# Patient Record
Sex: MALE | Race: WHITE | Employment: FULL TIME | URBAN - METROPOLITAN AREA
[De-identification: names, ages, dates, MRNs, and addresses within clinical notes are randomized per-mention and may not be internally consistent; named-entity substitution may affect disease eponyms.]

---

## 2019-02-27 ENCOUNTER — OFFICE VISIT (OUTPATIENT)
Dept: URGENT CARE | Facility: URGENT CARE | Age: 29
End: 2019-02-27
Payer: COMMERCIAL

## 2019-02-27 VITALS
OXYGEN SATURATION: 96 % | HEART RATE: 89 BPM | WEIGHT: 148 LBS | TEMPERATURE: 98.2 F | SYSTOLIC BLOOD PRESSURE: 130 MMHG | DIASTOLIC BLOOD PRESSURE: 68 MMHG

## 2019-02-27 DIAGNOSIS — R59.9 SWOLLEN GLAND: ICD-10-CM

## 2019-02-27 DIAGNOSIS — J02.9 SORE THROAT: Primary | ICD-10-CM

## 2019-02-27 LAB
BASOPHILS # BLD AUTO: 0 10E9/L (ref 0–0.2)
BASOPHILS NFR BLD AUTO: 0.4 %
DEPRECATED S PYO AG THROAT QL EIA: NORMAL
DIFFERENTIAL METHOD BLD: NORMAL
EOSINOPHIL # BLD AUTO: 0.2 10E9/L (ref 0–0.7)
EOSINOPHIL NFR BLD AUTO: 2.9 %
ERYTHROCYTE [DISTWIDTH] IN BLOOD BY AUTOMATED COUNT: 12.6 % (ref 10–15)
HCT VFR BLD AUTO: 42 % (ref 40–53)
HETEROPH AB SER QL: NEGATIVE
HGB BLD-MCNC: 14.7 G/DL (ref 13.3–17.7)
LYMPHOCYTES # BLD AUTO: 2.9 10E9/L (ref 0.8–5.3)
LYMPHOCYTES NFR BLD AUTO: 34.5 %
MCH RBC QN AUTO: 31.5 PG (ref 26.5–33)
MCHC RBC AUTO-ENTMCNC: 35 G/DL (ref 31.5–36.5)
MCV RBC AUTO: 90 FL (ref 78–100)
MONOCYTES # BLD AUTO: 1 10E9/L (ref 0–1.3)
MONOCYTES NFR BLD AUTO: 11.8 %
NEUTROPHILS # BLD AUTO: 4.2 10E9/L (ref 1.6–8.3)
NEUTROPHILS NFR BLD AUTO: 50.4 %
PLATELET # BLD AUTO: 307 10E9/L (ref 150–450)
RBC # BLD AUTO: 4.67 10E12/L (ref 4.4–5.9)
SPECIMEN SOURCE: NORMAL
WBC # BLD AUTO: 8.3 10E9/L (ref 4–11)

## 2019-02-27 PROCEDURE — 87880 STREP A ASSAY W/OPTIC: CPT | Performed by: FAMILY MEDICINE

## 2019-02-27 PROCEDURE — 86308 HETEROPHILE ANTIBODY SCREEN: CPT | Performed by: FAMILY MEDICINE

## 2019-02-27 PROCEDURE — 99203 OFFICE O/P NEW LOW 30 MIN: CPT | Performed by: FAMILY MEDICINE

## 2019-02-27 PROCEDURE — 85025 COMPLETE CBC W/AUTO DIFF WBC: CPT | Performed by: FAMILY MEDICINE

## 2019-02-27 PROCEDURE — 87081 CULTURE SCREEN ONLY: CPT | Performed by: FAMILY MEDICINE

## 2019-02-27 PROCEDURE — 36415 COLL VENOUS BLD VENIPUNCTURE: CPT | Performed by: FAMILY MEDICINE

## 2019-02-27 NOTE — PROGRESS NOTES
SUBJECTIVE:   New patient to [facility]    Atul Carnes is a 28 year old male presenting with a chief complaint of sore throat, swollen gland.  No fever.  Mild sinus congestion initially but has improved.  No ear pain, no cough.  Onset of symptoms was 3 week(s) ago.  Course of illness is worsening.    Severity moderate  Current and Associated symptoms: left sided swollen gland  Treatment measures tried include Fluids, Rest and ibuprofen.  Predisposing factors include None.    Overall healthy.  No prior mono infection.  Last dental exam couple of years ago, no dental pain.    No current Primary Provider    No past medical history on file.  No current outpatient medications on file.     Social History     Tobacco Use     Smoking status: Never Smoker     Smokeless tobacco: Never Used   Substance Use Topics     Alcohol use: Not on file       ROS:  Review of systems negative except as stated above.    OBJECTIVE:  /68 (Cuff Size: Adult Regular)   Pulse 89   Temp 98.2  F (36.8  C) (Oral)   Wt 67.1 kg (148 lb)   SpO2 96%   GENERAL APPEARANCE: healthy, alert and no distress  EYES: EOMI,  PERRL, conjunctiva clear  HENT: ear canals and TM's normal.  Nose and mouth without ulcers, erythema or lesions  NECK: supple, nontender, left submandibular enlarge gland  RESP: lungs clear to auscultation - no rales, rhonchi or wheezes  CV: regular rates and rhythm, normal S1 S2, no murmur noted  Extremities: no peripheral edema or tenderness, peripheral pulses normal  NEURO: Normal strength and tone, sensory exam grossly normal,  normal speech and mentation  SKIN: no suspicious lesions or rashes  PSYCH: mentation appears normal and affect normal/bright    Results for orders placed or performed in visit on 02/27/19   CBC with platelets and differential   Result Value Ref Range    WBC 8.3 4.0 - 11.0 10e9/L    RBC Count 4.67 4.4 - 5.9 10e12/L    Hemoglobin 14.7 13.3 - 17.7 g/dL    Hematocrit 42.0 40.0 - 53.0 %    MCV 90 78 - 100 fl     MCH 31.5 26.5 - 33.0 pg    MCHC 35.0 31.5 - 36.5 g/dL    RDW 12.6 10.0 - 15.0 %    Platelet Count 307 150 - 450 10e9/L    Diff Method Automated Method     % Neutrophils 50.4 %    % Lymphocytes 34.5 %    % Monocytes 11.8 %    % Eosinophils 2.9 %    % Basophils 0.4 %    Absolute Neutrophil 4.2 1.6 - 8.3 10e9/L    Absolute Lymphocytes 2.9 0.8 - 5.3 10e9/L    Absolute Monocytes 1.0 0.0 - 1.3 10e9/L    Absolute Eosinophils 0.2 0.0 - 0.7 10e9/L    Absolute Basophils 0.0 0.0 - 0.2 10e9/L   Mononucleosis screen   Result Value Ref Range    Mononucleosis Screen Negative NEG^Negative   Rapid strep screen   Result Value Ref Range    Specimen Description Throat     Rapid Strep A Screen       NEGATIVE: No Group A streptococcal antigen detected by immunoassay, await culture report.       ASSESSMENT/PLAN:  (J02.9) Sore throat  (primary encounter diagnosis)  Comment: viral  Plan: Rapid strep screen, Beta strep group A culture,        CBC with platelets and differential,         Mononucleosis screen            (R59.9) Swollen gland  Comment: viral  Plan: CBC with platelets and differential,         Mononucleosis screen            Reassurance given, patient appears well and non-toxic appearing.  Will follow up on throat culture and treat if positive for strep.  Encourage to obtain dental exam to ensure swollen gland is not due to teeth etiology.  Recommend tylenol, ibuprofen and warm compress to area.     Encourage to establish with primary provider for recheck if still present in 2 weeks.    Jordin Calix MD  February 27, 2019 6:11 PM

## 2019-02-28 LAB
BACTERIA SPEC CULT: NORMAL
SPECIMEN SOURCE: NORMAL

## 2019-04-25 ENCOUNTER — OFFICE VISIT (OUTPATIENT)
Dept: PEDIATRICS | Facility: CLINIC | Age: 29
End: 2019-04-25
Payer: COMMERCIAL

## 2019-04-25 VITALS
WEIGHT: 134.8 LBS | TEMPERATURE: 97.9 F | BODY MASS INDEX: 21.16 KG/M2 | SYSTOLIC BLOOD PRESSURE: 106 MMHG | HEART RATE: 85 BPM | HEIGHT: 67 IN | DIASTOLIC BLOOD PRESSURE: 70 MMHG | OXYGEN SATURATION: 98 %

## 2019-04-25 DIAGNOSIS — Z23 NEED FOR PNEUMOCOCCAL VACCINATION: ICD-10-CM

## 2019-04-25 DIAGNOSIS — J45.20 MILD INTERMITTENT ASTHMA WITHOUT COMPLICATION: Primary | ICD-10-CM

## 2019-04-25 DIAGNOSIS — Z23 NEED FOR TETANUS BOOSTER: ICD-10-CM

## 2019-04-25 PROCEDURE — 90715 TDAP VACCINE 7 YRS/> IM: CPT | Performed by: STUDENT IN AN ORGANIZED HEALTH CARE EDUCATION/TRAINING PROGRAM

## 2019-04-25 PROCEDURE — 90472 IMMUNIZATION ADMIN EACH ADD: CPT | Performed by: STUDENT IN AN ORGANIZED HEALTH CARE EDUCATION/TRAINING PROGRAM

## 2019-04-25 PROCEDURE — 90732 PPSV23 VACC 2 YRS+ SUBQ/IM: CPT | Performed by: STUDENT IN AN ORGANIZED HEALTH CARE EDUCATION/TRAINING PROGRAM

## 2019-04-25 PROCEDURE — 90471 IMMUNIZATION ADMIN: CPT | Performed by: STUDENT IN AN ORGANIZED HEALTH CARE EDUCATION/TRAINING PROGRAM

## 2019-04-25 PROCEDURE — 99213 OFFICE O/P EST LOW 20 MIN: CPT | Mod: GC | Performed by: STUDENT IN AN ORGANIZED HEALTH CARE EDUCATION/TRAINING PROGRAM

## 2019-04-25 RX ORDER — LORATADINE 10 MG/1
10 TABLET ORAL DAILY
COMMUNITY

## 2019-04-25 RX ORDER — ALBUTEROL SULFATE 90 UG/1
2 AEROSOL, METERED RESPIRATORY (INHALATION) EVERY 4 HOURS PRN
Qty: 8.5 G | Refills: 3 | Status: SHIPPED | OUTPATIENT
Start: 2019-04-25 | End: 2019-10-21

## 2019-04-25 RX ORDER — CETIRIZINE HYDROCHLORIDE 5 MG/1
5 TABLET ORAL DAILY
COMMUNITY

## 2019-04-25 ASSESSMENT — ASTHMA QUESTIONNAIRES
QUESTION_2 LAST FOUR WEEKS HOW OFTEN HAVE YOU HAD SHORTNESS OF BREATH: ONCE OR TWICE A WEEK
QUESTION_4 LAST FOUR WEEKS HOW OFTEN HAVE YOU USED YOUR RESCUE INHALER OR NEBULIZER MEDICATION (SUCH AS ALBUTEROL): NOT AT ALL
ACT_TOTALSCORE: 20
QUESTION_3 LAST FOUR WEEKS HOW OFTEN DID YOUR ASTHMA SYMPTOMS (WHEEZING, COUGHING, SHORTNESS OF BREATH, CHEST TIGHTNESS OR PAIN) WAKE YOU UP AT NIGHT OR EARLIER THAN USUAL IN THE MORNING: NOT AT ALL
QUESTION_1 LAST FOUR WEEKS HOW MUCH OF THE TIME DID YOUR ASTHMA KEEP YOU FROM GETTING AS MUCH DONE AT WORK, SCHOOL OR AT HOME: A LITTLE OF THE TIME
ACUTE_EXACERBATION_TODAY: NO
QUESTION_5 LAST FOUR WEEKS HOW WOULD YOU RATE YOUR ASTHMA CONTROL: POORLY CONTROLLED

## 2019-04-25 ASSESSMENT — MIFFLIN-ST. JEOR: SCORE: 1540.08

## 2019-04-25 NOTE — PATIENT INSTRUCTIONS
- will send the prescription for albuterol inhaler to [facility]  - will update your tetanus and pneumonia vaccine today      Patient Education     Asthma Medicine     Get used to using your inhaled corticosteroid medicine before you brush your teeth. That way you will always rinse your mouth afterward.   Medicines play a key role in controlling asthma. Some help control asthma symptoms and prevent flare-ups. Others are used to treat symptoms when they occur. Always take your medicine as prescribed. Know the names of your medicines and how and when to use them. If you have any questions about your medicines, talk with your healthcare provider or pharmacist.  Quick-relief medicine  Quick-relief (also called  rescue ) medicines work by relaxing the muscles around the airways. This helps ease symptoms such as coughing, wheezing, and shortness of breath. Keep your quick-relief inhaler with you at all times. Quick-relief medicines:    Are inhaled when needed and only when needed. Use your quick-relief medicine when you first notice your asthma is getting worse.    Start to open the airways within a few minutes after you use them.    Can help stop a flare-up once it has begun.    May be used before exercise as directed by your healthcare provider.  Long-term control medicine  Long-term control (also called  maintenance  or controller) medicines help reduce swelling and inflammation of the airways. Some keep the muscles around your airways relaxed. This makes the airways less sensitive to triggers and less likely to flare up. Long-term control medicines:    Are taken on a schedule. For most people, this is every day. They are taken even when you feel fine.    Help keep asthma under control so you re less likely to have symptoms.    Will not stop a flare-up once it has begun.     Inhaled corticosteroids  Inhaled corticosteroids are safe for long-term use. They are not the  steroids  that you hear about athletes abusing. They  usually don't cause serious side effects. That s because they re inhaled directly into the lungs. The chance of minor side effects can be lowered even more if you:    Use a spacer with your inhaler. Ask your healthcare provider or pharmacist about using a spacer if you don't currently use one.    Rinse your mouth, gargle, and spit out the water after using your inhaled corticosteroid medicine.    Follow all instructions for cleaning inhalers and spacers.    Work with your healthcare provider to find the lowest dose that controls your asthma.      Tips for taking medicine  Remembering to take medicine each day can be hard for anyone. It can be even harder to remember when you don t have symptoms. Try these tips:    Develop a routine. For example, take long-term controllers as part of getting ready for bed, before you brush your teeth in the morning, or both.    Make sure you understand what long-term controllers do and don t do.    Refill your prescriptions on time, or even ahead of time, so you don t run out.    Carry your quick-relief medicine with you. If you can, keep a spare quick-relief inhaler at work, at school, or in your gym bag.    When you travel, make sure you have enough medicine to last for your entire trip.    When traveling by air, keep your medicines with you, not packed in your luggage.    Make sure you know how to tell if your inhaler is empty. Ask your provider or pharmacist, or check the instructions that come with your inhaler.  Working with your healthcare provider  By working with your healthcare provider, you can get the most benefit from your medicine. Talk with your healthcare provider about:    Getting the right dose. Over time, your healthcare provider may raise or lower the dose of your controller medicine. The goal is to find the amount of medicine to keep asthma in control, without taking more than is needed.    Finding the right medicines for you. Each person is unique. It may take a  few tries to find the right medicine or combination of medicines for you. If one medicine doesn t work well for you, another may work better.    Minimizing side effects. If you have side effects, don t just stop taking your medicine. Instead, call your healthcare provider. A new medicine or change in the amount of medicine may solve the problem.  Date Last Reviewed: 10/1/2016    5769-4007 [employer]. [address] [place] [address], [place] [id number]. All rights reserved. This information is not intended as a substitute for professional medical care. Always follow your healthcare professional's instructions.

## 2019-04-25 NOTE — PROGRESS NOTES
SUBJECTIVE:   Atul Carnes is a 28 year old male who presents to clinic today for the following   health issues:      Asthma Follow-Up    Was ACT completed today?  Yes    ACT Total Scores 4/25/2019   ACT TOTAL SCORE (Goal Greater than or Equal to 20) 20   In the past 12 months, how many times did you visit the emergency room for your asthma without being admitted to the hospital? 0   In the past 12 months, how many times were you hospitalized overnight because of your asthma? 0       Recent asthma triggers that patient is dealing with: exercise or sports and cold air    Amount of exercise or physical activity: 1-2x/week for 60-90 minutes     Problems taking medications regularly: No    Medication side effects: none    Diet: Keto diet     Atul is a 28-year-old tab with history of mild intermittent asthma who presents to clinic for asthma follow-up. Diagnosed with asthma when he was young.  He was only on as needed albuterol inhaler and his asthma symptoms mostly occur with activity.  He has been off albuterol inhaler since 11-13 yo.  Last week, he was mountain biking and felt shortness of breath, coughing, and wheezing. No night symptoms. Personal hx of allergy and eczema.  Family history of asthma and mom.  Former smoker and has quit for about 5 years now. Occasional alcohol use, no recreational drugs. 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 No mold issues. Carpet at home. No large bodies of water or much trees. No pets    Additional history: as documented    Reviewed  and updated as needed this visit by clinical staff  Allergies         Reviewed and updated as needed this visit by Provider  Tobacco  Allergies  Meds  Problems  Med Hx  Surg Hx  Fam Hx         There is no problem list on file for this patient.    History reviewed. No pertinent surgical history.    Social History     Tobacco Use     Smoking status: Former Smoker     Smokeless tobacco: Never Used   Substance Use Topics     Alcohol use:  "Not on file     Family History   Problem Relation Age of Onset     Asthma Mother      No Known Problems Father      No Known Problems Sister          Current Outpatient Medications   Medication Sig Dispense Refill     Fluticasone Propionate (FLONASE NA)        loratadine (CLARITIN) 10 MG tablet Take 10 mg by mouth daily       cetirizine (ZYRTEC) 5 MG tablet Take 5 mg by mouth daily       No Known Allergies    ROS:  -Please refer to HPI for review of systems    OBJECTIVE:   /70 (BP Location: Right arm, Patient Position: Chair, Cuff Size: Adult Regular)   Pulse 85   Temp 97.9  F (36.6  C) (Oral)   Ht 1.702 m (5' 7\")   Wt 61.1 kg (134 lb 12.8 oz)   SpO2 98%   BMI 21.11 kg/m    Body mass index is 21.11 kg/m .     GENERAL: healthy, alert and no distress  EYES: Normal conjunctiva, anicteric sclera  HENT: ear canals and TM's normal, moist mucous membrane, non-erythematous oropharynx  NECK: Supple, full range of motion, no lymphadenopathy  RESP: lungs clear to auscultation - no rales, rhonchi or wheezes  CV: regular rate and rhythm, normal S1 S2, no S3 or S4, no murmur, click or rub, no peripheral edema and peripheral pulses strong  MS: Full range of motion, no gross musculoskeletal defects noted, no edema  SKIN: no suspicious lesions or rashes  NEURO: Alert, normal tone and strength, normal sensation, gait is normal  PSYCH: mentation appears normal, affect normal/bright    Diagnostic Test Results:  none     ASSESSMENT/PLAN:   1. Mild intermittent asthma without complication  Diagnosis early when he was younger and was only on as needed albuterol inhaler.  Has been off for many years but now noticing multiple more symptoms especially with activity.  On exam, no wheezing is noted and he is breathing comfortably on room air.  Does not appear to be in any asthma exacerbation.  Based on his history, this is categorized into mild intermittent asthma.  He will not need any daily inhaler but would benefit from as " needed albuterol inhaler especially with activity  - albuterol (PROAIR HFA/PROVENTIL HFA/VENTOLIN HFA) 108 (90 Base) MCG/ACT inhaler; Inhale 2 puffs into the lungs every 4 hours as needed for shortness of breath / dyspnea or wheezing  Dispense: 8.5 g; Refill: 3    2. Need for tetanus booster  - TDAP, IM (10 - 64 YRS) - Adacel    3. Need for pneumococcal vaccination  - PPSV23, IM/SUBQ (2+ YRS) - [id number]    FUTURE APPOINTMENTS:       - Follow-up visit in 1 year for annual physical    Patient discussed with Dr. Hayden Ramírez MD  [facility]    I have seen this patient and examined him in the presence of Dr. Ramírez.  I was present during the key components of the presenting complaints, physical exam, diagnosis, and plan, and fully concur with the plan as listed in the resident's note.    Hector Blake MD  Internal Medicine and Pediatrics

## 2019-04-26 ASSESSMENT — ASTHMA QUESTIONNAIRES: ACT_TOTALSCORE: 20

## 2019-09-30 ENCOUNTER — HEALTH MAINTENANCE LETTER (OUTPATIENT)
Age: 29
End: 2019-09-30

## 2019-10-19 ENCOUNTER — MYC REFILL (OUTPATIENT)
Dept: PEDIATRICS | Facility: CLINIC | Age: 29
End: 2019-10-19

## 2019-10-19 DIAGNOSIS — J45.20 MILD INTERMITTENT ASTHMA WITHOUT COMPLICATION: ICD-10-CM

## 2019-10-19 RX ORDER — ALBUTEROL SULFATE 90 UG/1
2 AEROSOL, METERED RESPIRATORY (INHALATION) EVERY 4 HOURS PRN
Qty: 8.5 G | Refills: 3 | Status: CANCELLED | OUTPATIENT
Start: 2019-10-19

## 2019-10-19 NOTE — TELEPHONE ENCOUNTER
"Requested Prescriptions   Pending Prescriptions Disp Refills     albuterol (PROAIR HFA/PROVENTIL HFA/VENTOLIN HFA) 108 (90 Base) MCG/ACT inhaler  Last Written Prescription Date:  04/25/2019  Last Fill Quantity: 8.5 g,  # refills: 3   Last Office Visit: 4/25/2019 Truong Ramírez MD   Future Office Visit:      8.5 g 3     Sig: Inhale 2 puffs into the lungs every 4 hours as needed for shortness of breath / dyspnea or wheezing       Asthma Maintenance Inhalers - Anticholinergics Passed - 10/19/2019  1:38 PM        Passed - Patient is age 12 years or older        Passed - Asthma control assessment score within normal limits in last 6 months     Please review ACT score.   ACT Total Scores 4/25/2019   ACT TOTAL SCORE (Goal Greater than or Equal to 20) 20   In the past 12 months, how many times did you visit the emergency room for your asthma without being admitted to the hospital? 0   In the past 12 months, how many times were you hospitalized overnight because of your asthma? 0             Passed - Medication is active on med list        Passed - Recent (6 mo) or future (30 days) visit within the authorizing provider's specialty     Patient had office visit in the last 6 months or has a visit in the next 30 days with authorizing provider or within the authorizing provider's specialty.  See \"Patient Info\" tab in inbasket, or \"Choose Columns\" in Meds & Orders section of the refill encounter.              "

## 2019-10-21 RX ORDER — ALBUTEROL SULFATE 90 UG/1
2 AEROSOL, METERED RESPIRATORY (INHALATION) EVERY 4 HOURS PRN
Qty: 8.5 G | Refills: 3 | Status: SHIPPED | OUTPATIENT
Start: 2019-10-21

## 2021-01-15 ENCOUNTER — HEALTH MAINTENANCE LETTER (OUTPATIENT)
Age: 31
End: 2021-01-15

## 2021-10-24 ENCOUNTER — HEALTH MAINTENANCE LETTER (OUTPATIENT)
Age: 31
End: 2021-10-24

## 2022-02-13 ENCOUNTER — HEALTH MAINTENANCE LETTER (OUTPATIENT)
Age: 32
End: 2022-02-13

## 2022-10-15 ENCOUNTER — HEALTH MAINTENANCE LETTER (OUTPATIENT)
Age: 32
End: 2022-10-15

## 2023-03-26 ENCOUNTER — HEALTH MAINTENANCE LETTER (OUTPATIENT)
Age: 33
End: 2023-03-26